# Patient Record
Sex: MALE | Race: WHITE | NOT HISPANIC OR LATINO | Employment: FULL TIME | URBAN - NONMETROPOLITAN AREA
[De-identification: names, ages, dates, MRNs, and addresses within clinical notes are randomized per-mention and may not be internally consistent; named-entity substitution may affect disease eponyms.]

---

## 2020-01-27 ENCOUNTER — OFFICE VISIT (OUTPATIENT)
Dept: MEDICAL GROUP | Facility: PHYSICIAN GROUP | Age: 40
End: 2020-01-27
Payer: COMMERCIAL

## 2020-01-27 VITALS
DIASTOLIC BLOOD PRESSURE: 80 MMHG | BODY MASS INDEX: 31.56 KG/M2 | SYSTOLIC BLOOD PRESSURE: 124 MMHG | OXYGEN SATURATION: 98 % | RESPIRATION RATE: 17 BRPM | TEMPERATURE: 97.9 F | HEART RATE: 90 BPM | WEIGHT: 233 LBS | HEIGHT: 72 IN

## 2020-01-27 DIAGNOSIS — R19.00 ABDOMINAL MASS, UNSPECIFIED ABDOMINAL LOCATION: ICD-10-CM

## 2020-01-27 DIAGNOSIS — R05.3 CHRONIC COUGH: ICD-10-CM

## 2020-01-27 DIAGNOSIS — Z00.00 ANNUAL PHYSICAL EXAM: ICD-10-CM

## 2020-01-27 DIAGNOSIS — Z23 NEED FOR VACCINATION: ICD-10-CM

## 2020-01-27 PROCEDURE — 90471 IMMUNIZATION ADMIN: CPT | Performed by: NURSE PRACTITIONER

## 2020-01-27 PROCEDURE — 90715 TDAP VACCINE 7 YRS/> IM: CPT | Performed by: NURSE PRACTITIONER

## 2020-01-27 PROCEDURE — 99204 OFFICE O/P NEW MOD 45 MIN: CPT | Mod: 25 | Performed by: NURSE PRACTITIONER

## 2020-01-27 SDOH — HEALTH STABILITY: MENTAL HEALTH: HOW OFTEN DO YOU HAVE A DRINK CONTAINING ALCOHOL?: 2-3 TIMES A WEEK

## 2020-01-27 SDOH — HEALTH STABILITY: PHYSICAL HEALTH: ON AVERAGE, HOW MANY DAYS PER WEEK DO YOU ENGAGE IN MODERATE TO STRENUOUS EXERCISE (LIKE A BRISK WALK)?: 7 DAYS

## 2020-01-27 ASSESSMENT — ENCOUNTER SYMPTOMS
VOMITING: 0
CONSTIPATION: 0
COUGH: 1
DIZZINESS: 0
FEVER: 0
ABDOMINAL PAIN: 1
HEADACHES: 0
WHEEZING: 1
PALPITATIONS: 0
SPUTUM PRODUCTION: 0
DEPRESSION: 0
HEMOPTYSIS: 0
BLOOD IN STOOL: 0
CHILLS: 0
SHORTNESS OF BREATH: 1
DIARRHEA: 0
NAUSEA: 0

## 2020-01-27 ASSESSMENT — PATIENT HEALTH QUESTIONNAIRE - PHQ9: CLINICAL INTERPRETATION OF PHQ2 SCORE: 0

## 2020-01-27 NOTE — PROGRESS NOTES
New Patient appointment    Mason Lehman is a 39 y.o. male here to establish care. His previous PCP was none. He  presents with the following concerns:    HPI:      Abdominal Pain   This is a new problem. The current episode started more than 1 year ago. The problem has been gradually worsening. The pain is located in the periumbilical region. The pain is at a severity of 3/10. The pain is mild. The abdominal pain does not radiate. Pertinent negatives include no constipation, diarrhea, dysuria, fever, frequency, headaches, nausea or vomiting. The pain is aggravated by movement. The pain is relieved by nothing. He has tried nothing for the symptoms.   Cough   This is a new problem. The current episode started more than 1 month ago. The problem has been waxing and waning. The cough is non-productive. Associated symptoms include shortness of breath and wheezing. Pertinent negatives include no chest pain, chills, fever, headaches or hemoptysis. Associated symptoms comments: Snoring and apneic episodes  . The symptoms are aggravated by lying down. Risk factors for lung disease include smoking/tobacco exposure (He also reports 2 year history of meth use ). He has tried nothing for the symptoms. His past medical history is significant for asthma.        Current medicines (including changes today)  Current Outpatient Medications   Medication Sig Dispense Refill   • albuterol (VENTOLIN OR PROVENTIL) 108 (90 BASE) MCG/ACT Aero Soln inhalation aerosol Inhale 2 Puffs by mouth every four hours as needed for Shortness of Breath. 1 Inhaler 1   • albuterol (PROVENTIL) 2.5mg/0.5ml Nebu Soln 0.5 mL by Nebulization route every four hours as needed for Shortness of Breath. 1 Bottle 0   • promethazine-codeine (PHENERGAN-CODEINE) 6.25-10 MG/5ML Syrup Take 5 mL by mouth 4 times a day as needed for Cough. 120 mL 0     No current facility-administered medications for this visit.      He  has a past medical history of Asthma and MRSA  (methicillin resistant Staphylococcus aureus).  He  has a past surgical history that includes appendectomy.  Social History     Tobacco Use   • Smoking status: Former Smoker     Packs/day: 1.00     Years: 10.00     Pack years: 10.00     Types: Cigarettes     Last attempt to quit: 2019     Years since quittin.0   • Smokeless tobacco: Never Used   Substance Use Topics   • Alcohol use: Yes     Alcohol/week: 1.8 oz     Types: 3 Cans of beer per week     Frequency: 2-3 times a week   • Drug use: Not Currently     Types: Methamphetamines     Comment: daily for 2 years; sober 8 years      Social History     Patient does not qualify to have social determinant information on file (likely too young).   Social History Narrative   • Not on file     Family History   Problem Relation Age of Onset   • Arthritis Mother    • No Known Problems Father    • No Known Problems Sister    • No Known Problems Maternal Grandmother    • No Known Problems Maternal Grandfather    • No Known Problems Daughter    • No Known Problems Daughter    • No Known Problems Daughter    • No Known Problems Son      No family status information on file.       Review of Systems   Constitutional: Negative for chills, fever and malaise/fatigue.   HENT: Negative for hearing loss.    Respiratory: Positive for cough, shortness of breath and wheezing. Negative for hemoptysis and sputum production.    Cardiovascular: Negative for chest pain and palpitations.   Gastrointestinal: Positive for abdominal pain. Negative for blood in stool, constipation, diarrhea, nausea and vomiting.   Genitourinary: Negative for dysuria and frequency.   Neurological: Negative for dizziness and headaches.   Psychiatric/Behavioral: Negative for depression.       All other systems reviewed and are negative    Depression Screening    Little interest or pleasure in doing things?  0 - not at all  Feeling down, depressed , or hopeless? 0 - not at all  Patient Health Questionnaire  Score: 0    If depressive symptoms identified deferred to follow up visit unless specifically addressed in assesment and plan.      Interpretation of PHQ-9 Total Score   Score Severity   1-4 Minimal Depression   5-9 Mild Depression   10-14 Moderate Depression   15-19 Moderately Severe Depression   20-27 Severe Depression     Objective:     /80   Pulse 90   Temp 36.6 °C (97.9 °F) (Temporal)   Resp 17   Ht 1.829 m (6')   Wt 105.7 kg (233 lb)   SpO2 98%  Body mass index is 31.6 kg/m².    Physical Exam:  Constitutional: Oriented to person, place, and time and well-developed, well-nourished, and in no distress.   HENT:   Head: Normocephalic and atraumatic.   Right Ear: Tympanic membrane and external ear normal.   Left Ear: Tympanic membrane and external ear normal.   Mouth/Throat: Oropharynx is clear and moist and mucous membranes are normal. No oropharyngeal exudate or posterior oropharyngeal erythema.   Eyes: Conjunctivae and EOM are normal. Pupils are equal, round, and reactive to light.   Neck: Normal range of motion. Neck supple. No thyromegaly present.   Cardiovascular: Normal rate, regular rhythm, normal heart sounds. Radial pulses intact. Exam reveals no friction rub. No murmur heard.  Pulmonary/Chest: Effort normal and breath sounds normal. No respiratory distress or use of accessory muscles. No wheezes, rhonchi, or rales.   Abdominal: Soft. Bowel sounds are normal. Exhibits umbilical hernia that is reducible. There is no tenderness. No hepatosplenomegaly.    Musculoskeletal: Full range of motion. No deformity or swelling of joints. DTRs intact.   Neurological: Alert and oriented to person, place, and time. Gait normal.   Skin: Skin is warm and dry. No cyanosis. No edema.  Psychiatric: Mood, memory, affect and judgment normal.     Assessment and Plan:   The following treatment plan was discussed    1. Chronic cough  Chest xray ordered. Based on hx of smoking, meth use, and environmental exposures- he  was referred to pulmonology for further evaluation. I also recommend sleep study to r/o sleep apnea.  - REFERRAL TO PULMONOLOGY  - DX-CHEST-2 VIEWS; Future    2. Abdominal mass, unspecified abdominal location  His symptoms most resemble umbilical hernia. US of abdomen ordered for further evaluation.   - US-HERNIA ABDOMEN; Future    3. Annual physical exam  Routine screening labs ordered.  - Comp Metabolic Panel; Future  - HEMOGLOBIN A1C; Future  - Lipid Profile; Future  - CBC WITH DIFFERENTIAL; Future  - TSH; Future  - FREE THYROXINE; Future    4. Need for vaccination  I have personally administered the ordered medication/vaccine.  - Tdap =>6yo IM    Records requested.    Followup: Return if symptoms worsen or fail to improve.

## 2020-02-11 ENCOUNTER — HOSPITAL ENCOUNTER (OUTPATIENT)
Dept: RADIOLOGY | Facility: MEDICAL CENTER | Age: 40
End: 2020-02-11
Attending: NURSE PRACTITIONER
Payer: COMMERCIAL

## 2020-02-11 DIAGNOSIS — R19.00 ABDOMINAL MASS, UNSPECIFIED ABDOMINAL LOCATION: ICD-10-CM

## 2020-02-11 DIAGNOSIS — R05.3 CHRONIC COUGH: ICD-10-CM

## 2020-02-11 PROCEDURE — 76705 ECHO EXAM OF ABDOMEN: CPT

## 2020-02-11 PROCEDURE — 71046 X-RAY EXAM CHEST 2 VIEWS: CPT

## 2020-05-11 ENCOUNTER — TELEMEDICINE2 (OUTPATIENT)
Dept: PULMONOLOGY | Facility: HOSPICE | Age: 40
End: 2020-05-11
Payer: COMMERCIAL

## 2020-05-11 VITALS
OXYGEN SATURATION: 95 % | HEART RATE: 68 BPM | RESPIRATION RATE: 16 BRPM | BODY MASS INDEX: 32.21 KG/M2 | DIASTOLIC BLOOD PRESSURE: 84 MMHG | HEIGHT: 70 IN | SYSTOLIC BLOOD PRESSURE: 148 MMHG | WEIGHT: 225 LBS | TEMPERATURE: 98.2 F

## 2020-05-11 DIAGNOSIS — R05.9 COUGH: ICD-10-CM

## 2020-05-11 DIAGNOSIS — J45.30 MILD PERSISTENT ASTHMA WITHOUT COMPLICATION: ICD-10-CM

## 2020-05-11 PROCEDURE — 99204 OFFICE O/P NEW MOD 45 MIN: CPT | Performed by: INTERNAL MEDICINE

## 2020-05-11 ASSESSMENT — PAIN SCALES - GENERAL: PAINLEVEL: NO PAIN

## 2020-05-11 NOTE — PROGRESS NOTES
Chief Complaint   Patient presents with   • New Patient     Cough       HPI:  The patient is a 40-year-old male who has had a cough on an almost daily basis for the last year or so.  He has a 10-pack-year smoking history.  He quit smoking in January 2019.  He said he had an upper respiratory infection in March 2019 and although he recovered he was left with a daily cough which usually occurs in the morning.  He does have a history of asthma as a child.  He does have seasonal allergies.  A prior CBC did not show any significant eosinophilia.  His chest x-ray showed some minimal atelectasis on the left.  He does have occasional wheezing.  His cough and symptoms are relieved with albuterol.  He has not been on any topical anti-inflammatory therapy.    Please note that I could not evaluate the patient in person at the site. All examination and evaluation of the patient and information gathering from the patient and/or the family were done jointly by the requesting provider, [facility] Messina at the site and me via licensed and securely encrypted tele-video communication equipment with the assistance of a trained tele-presenter at the originating site.  As such, my assessments and recommendations are limited as far as such telecommunication allows.    Consulting MD: Taz Aiken M.D.  Requesting Physician: Deann BRIAN  Patient name:      Mason Lehman        Past Medical History:   Diagnosis Date   • Asthma    • Cough    • MRSA (methicillin resistant Staphylococcus aureus)        ROS:   Constitutional: Denies fevers, chills, night sweats, fatigue or weight loss  Eyes: Denies vision loss, pain, drainage, double vision  Ears, Nose, Throat: Denies earache, tinnitus, hoarseness  Cardiovascular: Denies chest pain, tightness, palpitations  Respiratory: See HPI  Sleep: Denies, snoring, apnea  GI: Denies abdominal pain, nausea, vomiting, diarrhea  : Denies frequent urination, hematuria, painful  urination  Musculoskeletal: Denies back pain, painful joints, sore muscles  Neurological: Denies headaches, seizures  Skin: Denies rashes, color changes  Psychiatric: Denies depression or thoughts of suicide  Hematologic: Denies bleeding tendency or clotting tendency  Allergic/Immunologic: Denies rhinitis, skin sensitivity    Social History     Socioeconomic History   • Marital status:      Spouse name: Not on file   • Number of children: Not on file   • Years of education: Not on file   • Highest education level: Not on file   Occupational History   • Not on file   Social Needs   • Financial resource strain: Not on file   • Food insecurity     Worry: Not on file     Inability: Not on file   • Transportation needs     Medical: Not on file     Non-medical: Not on file   Tobacco Use   • Smoking status: Former Smoker     Packs/day: 1.00     Years: 10.00     Pack years: 10.00     Types: Cigarettes     Last attempt to quit: 2019     Years since quittin.2   • Smokeless tobacco: Never Used   Substance and Sexual Activity   • Alcohol use: Yes     Alcohol/week: 1.8 oz     Types: 3 Cans of beer per week     Frequency: 2-3 times a week   • Drug use: Not Currently     Types: Methamphetamines     Comment: daily for 2 years; sober 8 years    • Sexual activity: Yes     Partners: Female     Comment: ; 12 years    Lifestyle   • Physical activity     Days per week: 7 days     Minutes per session: Not on file   • Stress: Not on file   Relationships   • Social connections     Talks on phone: Not on file     Gets together: Not on file     Attends Jainism service: Not on file     Active member of club or organization: Not on file     Attends meetings of clubs or organizations: Not on file     Relationship status: Not on file   • Intimate partner violence     Fear of current or ex partner: Not on file     Emotionally abused: Not on file     Physically abused: Not on file     Forced sexual activity: Not on file  "  Other Topics Concern   • Not on file   Social History Narrative   • Not on file     Patient has no known allergies.  Current Outpatient Medications on File Prior to Visit   Medication Sig Dispense Refill   • albuterol (VENTOLIN OR PROVENTIL) 108 (90 BASE) MCG/ACT Aero Soln inhalation aerosol Inhale 2 Puffs by mouth every four hours as needed for Shortness of Breath. 1 Inhaler 1   • albuterol (PROVENTIL) 2.5mg/0.5ml Nebu Soln 0.5 mL by Nebulization route every four hours as needed for Shortness of Breath. 1 Bottle 0   • promethazine-codeine (PHENERGAN-CODEINE) 6.25-10 MG/5ML Syrup Take 5 mL by mouth 4 times a day as needed for Cough. 120 mL 0     No current facility-administered medications on file prior to visit.      /84   Pulse 68   Temp 36.8 °C (98.2 °F) (Temporal)   Resp 16   Ht 1.778 m (5' 10\")   Wt 102.1 kg (225 lb)   SpO2 95%   Family History   Problem Relation Age of Onset   • Arthritis Mother    • No Known Problems Father    • No Known Problems Sister    • No Known Problems Maternal Grandmother    • No Known Problems Maternal Grandfather    • No Known Problems Daughter    • No Known Problems Daughter    • No Known Problems Daughter    • No Known Problems Son        Physical Exam:    HEENT: PERRLA, EOMI, no scleral icterus, no nasal or oral lesions  Neck: No thyromegaly, no adenopathy, no bruits  Mallampatti: Grade II  Lungs: Equal breath sounds, no wheezes or crackles  Heart: Regular rate and rhythm, no gallops or murmurs  Abdomen: Soft, benign, no organomegaly  Extremities: No clubbing, cyanosis, or edema  Neurologic: Cranial nerve, motor, and sensory exam are normal    1. Cough    2. Mild persistent asthma without complication        Although he is a former smoker and has been a  in the past it appears that his symptoms are primarily cough and wheezing after an upper respiratory infection in March 2019.  He does have a history of asthma as a child.  We will start him on Breo 200 mcg 1 " puff every day and rinse thoroughly.  At this point I do not think we need to do a full allergy work-up unless the Breo is not effective.  We did discuss the issue of insurance coverage.  If his insurance does not cover Breo then we may have to switch to an alternative agent.  We did discuss the concept of bronchospasm and inflammation.  I would like to give him a 6-week trial and see if that has any efficacy.  We will see him back at that time for repeat evaluation.

## 2020-05-12 ENCOUNTER — TELEPHONE (OUTPATIENT)
Dept: PULMONOLOGY | Facility: HOSPICE | Age: 40
End: 2020-05-12

## 2020-05-12 NOTE — TELEPHONE ENCOUNTER
Patient's spouse Valery called.  [facility] in [place] says that Dr. Mitchell has to give his ok, for the inhaler he prescribed, to be sent thru insurance before they can fill it.  Please call her back at [phone]

## 2020-05-12 NOTE — TELEPHONE ENCOUNTER
MEDICATION PRIOR AUTHORIZATION NEEDED:    1. Name of Medication: Breo Ellipta 200/25 mcg    2. Requested By (Name of Pharmacy): [facility]     3. Is insurance on file current? yes    4. What is the name & phone number of the 3rd party payor? [employer] [phone]

## 2020-05-12 NOTE — TELEPHONE ENCOUNTER
After review with Shanique prior authorization was placed and approved prescription for breo sent this morning breo 200     Patient's wife was Contacted informed Valery authorization was placed and approved and new script was sent to the pharmacy. It should now be approved if any issues to contact clinic

## 2020-05-12 NOTE — TELEPHONE ENCOUNTER
DOCUMENTATION OF PRIOR AUTH STATUS    1. Medication name and dose: Breo Ellipta 200-25 mcg    2. Name and Phone # of Prescription coverage company: [email or web address] [phone]    3. Date Prior Auth was submitted: 5/12/2020    4. What information was given to obtain insurance decision: Clinical notes    5. Prior Auth letter Approved or Denied: Approved through 5/12/2023    6. Pharmacy notified: Yes    7. Patient notified: Yes

## 2022-06-17 ENCOUNTER — OFFICE VISIT (OUTPATIENT)
Dept: MEDICAL GROUP | Facility: PHYSICIAN GROUP | Age: 42
End: 2022-06-17
Payer: COMMERCIAL

## 2022-06-17 VITALS
HEIGHT: 70 IN | RESPIRATION RATE: 16 BRPM | DIASTOLIC BLOOD PRESSURE: 80 MMHG | TEMPERATURE: 97.2 F | BODY MASS INDEX: 34.59 KG/M2 | WEIGHT: 241.62 LBS | OXYGEN SATURATION: 97 % | HEART RATE: 70 BPM | SYSTOLIC BLOOD PRESSURE: 132 MMHG

## 2022-06-17 DIAGNOSIS — Z00.00 HEALTHCARE MAINTENANCE: ICD-10-CM

## 2022-06-17 DIAGNOSIS — Z11.3 SCREENING FOR STD (SEXUALLY TRANSMITTED DISEASE): ICD-10-CM

## 2022-06-17 DIAGNOSIS — J45.20 MILD INTERMITTENT ASTHMA WITHOUT COMPLICATION: ICD-10-CM

## 2022-06-17 DIAGNOSIS — Z00.00 HEALTH CARE MAINTENANCE: ICD-10-CM

## 2022-06-17 DIAGNOSIS — Z11.3 SCREENING EXAMINATION FOR STD (SEXUALLY TRANSMITTED DISEASE): ICD-10-CM

## 2022-06-17 DIAGNOSIS — M54.2 PAIN IN NECK: ICD-10-CM

## 2022-06-17 DIAGNOSIS — R06.83 SNORING: ICD-10-CM

## 2022-06-17 PROCEDURE — 99386 PREV VISIT NEW AGE 40-64: CPT | Performed by: STUDENT IN AN ORGANIZED HEALTH CARE EDUCATION/TRAINING PROGRAM

## 2022-06-17 ASSESSMENT — ENCOUNTER SYMPTOMS
ORTHOPNEA: 0
FOCAL WEAKNESS: 0
SHORTNESS OF BREATH: 1
FEVER: 0
ABDOMINAL PAIN: 0
HEADACHES: 0
WHEEZING: 1
DIZZINESS: 0
CHILLS: 0
PALPITATIONS: 0
COUGH: 0

## 2022-06-17 ASSESSMENT — PATIENT HEALTH QUESTIONNAIRE - PHQ9: CLINICAL INTERPRETATION OF PHQ2 SCORE: 0

## 2022-06-17 NOTE — ASSESSMENT & PLAN NOTE
Mild wheezing on exam today, denies any shortness of breath.  Currently using albuterol inhaler once a week.  We will continue with this regimen and add a ICS if necessary.  Follow-up 6 weeks

## 2022-06-17 NOTE — PROGRESS NOTES
Subjective:   HISTORY OF THE PRESENT ILLNESS: Patient is a 42 y.o. male here today to establish care.    Problem   Pain in Neck    Feels some swelling in his throat anterior neck. Started a few months, and occurs intermittently. radha dysphagyia. He does swallow.      Snoring    Reports he has long history of snoring, is getting so bad that he has to sleep in a different room than his wife.  He does wake up at night gasping for air and is starting to feel irritation and fullness in his neck.  Denies any significant fatigue.     Healthcare Maintenance   Asthma    Use albuterol inhaler once a week, occasionally at night when he feels short of breath.  This may be due to sleep apnea as he does report that he snores a lot.  No recent hospitalizations for his asthma.  No longer taking Breo inhaler.    Quit smoking about 7 years ago on average about 1 pack a day for 10 years.        Current Outpatient Medications Ordered in Epic   Medication Sig Dispense Refill   • albuterol (VENTOLIN OR PROVENTIL) 108 (90 BASE) MCG/ACT Aero Soln inhalation aerosol Inhale 2 Puffs by mouth every four hours as needed for Shortness of Breath. 1 Inhaler 1   • albuterol (PROVENTIL) 2.5mg/0.5ml Nebu Soln 0.5 mL by Nebulization route every four hours as needed for Shortness of Breath. 1 Bottle 0     No current Epic-ordered facility-administered medications on file.       Past Medical History:   Diagnosis Date   • Asthma      Past Surgical History:   Procedure Laterality Date   • APPENDECTOMY       Social History     Tobacco Use   • Smoking status: Former Smoker     Packs/day: 1.00     Years: 10.00     Pack years: 10.00     Types: Cigarettes     Quit date: 1/27/2019     Years since quitting: 3.3   • Smokeless tobacco: Never Used   Vaping Use   • Vaping Use: Never used   Substance Use Topics   • Alcohol use: Yes     Alcohol/week: 1.8 oz     Types: 3 Cans of beer per week   • Drug use: Not Currently     Types: Methamphetamines     Comment: daily  "for 2 years; sober 10 years      Family History   Problem Relation Age of Onset   • Arthritis Mother    • No Known Problems Father    • No Known Problems Sister    • No Known Problems Maternal Grandmother    • No Known Problems Maternal Grandfather    • No Known Problems Daughter    • No Known Problems Daughter    • No Known Problems Daughter    • No Known Problems Son      Current Outpatient Medications   Medication Sig Dispense Refill   • albuterol (VENTOLIN OR PROVENTIL) 108 (90 BASE) MCG/ACT Aero Soln inhalation aerosol Inhale 2 Puffs by mouth every four hours as needed for Shortness of Breath. 1 Inhaler 1   • albuterol (PROVENTIL) 2.5mg/0.5ml Nebu Soln 0.5 mL by Nebulization route every four hours as needed for Shortness of Breath. 1 Bottle 0     No current facility-administered medications for this visit.       Health Maintenance: Completed    Review of Systems   Constitutional: Negative for chills and fever.   Respiratory: Positive for shortness of breath and wheezing. Negative for cough.    Cardiovascular: Negative for chest pain, palpitations, orthopnea and leg swelling.   Gastrointestinal: Negative for abdominal pain.   Musculoskeletal: Negative for joint pain.   Neurological: Negative for dizziness, focal weakness and headaches.          Objective:     Exam: /80   Pulse 70   Temp 36.2 °C (97.2 °F) (Temporal)   Resp 16   Ht 1.778 m (5' 10\")   Wt 110 kg (241 lb 10 oz)   SpO2 97%  Body mass index is 34.67 kg/m².    Physical Exam  Vitals reviewed.   Constitutional:       General: He is not in acute distress.     Appearance: Normal appearance. He is not ill-appearing or toxic-appearing.   HENT:      Head: Normocephalic and atraumatic.      Right Ear: Tympanic membrane, ear canal and external ear normal. There is no impacted cerumen.      Left Ear: Tympanic membrane, ear canal and external ear normal. There is no impacted cerumen.      Mouth/Throat:      Pharynx: Oropharynx is clear. No " oropharyngeal exudate or posterior oropharyngeal erythema.   Eyes:      General: No scleral icterus.        Right eye: No discharge.         Left eye: No discharge.      Conjunctiva/sclera: Conjunctivae normal.   Neck:      Vascular: No carotid bruit.   Cardiovascular:      Rate and Rhythm: Normal rate and regular rhythm.      Pulses: Normal pulses.      Heart sounds: Normal heart sounds. No murmur heard.  Pulmonary:      Effort: Pulmonary effort is normal. No respiratory distress.      Breath sounds: Normal breath sounds. No wheezing or rales.   Abdominal:      General: Abdomen is flat. Bowel sounds are normal. There is no distension.      Palpations: Abdomen is soft. There is no mass.      Tenderness: There is no abdominal tenderness. There is no guarding or rebound.   Musculoskeletal:      Cervical back: Neck supple.      Right lower leg: No edema.      Left lower leg: No edema.   Skin:     General: Skin is warm and dry.   Neurological:      General: No focal deficit present.      Mental Status: He is alert.   Psychiatric:         Mood and Affect: Mood normal.         Thought Content: Thought content normal.          Assessment & Plan:   42 y.o. male with the following -    Problem List Items Addressed This Visit     Pain in neck     No nodules, fullness, goiter on exam.  Oropharynx within normal limits please see assessment for snoring and work-up for sleep apnea.           Asthma     Mild wheezing on exam today, denies any shortness of breath.  Currently using albuterol inhaler once a week.  We will continue with this regimen and add a ICS if necessary.  Follow-up 6 weeks           Snoring     Sleep study ordered, advised that losing weight would help a lot towards his symptoms.  Stressed the importance of this test for diagnosing sleep apnea early as to avoid heart complications later in life.           Relevant Orders    Referral to Pulmonary and Sleep Medicine    Healthcare maintenance     Patient here for a  preventive medicine visit today and to establish care.  -Reviewed all past medical history, family history, social history    -Diet and exercise appropriate counseling given  -Social determinants of health reviewed  -Tobacco, alcohol, recreational drug use: Reviewed no concerns  -Occupation: Construction  -Cholesterol screening: Ordered  -Diabetes screening: Ordered    Immunizations/Infectious disease:  STI screening: Ordered  Safe sex practices discusssed  HIV screening: Ordered  Immunizations: Discussed COVID vaccination at next visit    Cancer screenings:  Colorectal cancer screening: Denies family history of colon cancer.               Other Visit Diagnoses     Health care maintenance        Relevant Orders    Lipid Profile    HEMOGLOBIN A1C    CBC WITHOUT DIFFERENTIAL    TSH WITH REFLEX TO FT4    VITAMIN D,25 HYDROXY    Screening examination for STD (sexually transmitted disease)        Screening for STD (sexually transmitted disease)        Relevant Orders    Chlamydia/GC, PCR (Urine)    T.PALLIDUM AB EIA    HIV AG/AB COMBO ASSAY SCREENING           Return in about 6 weeks (around 7/29/2022).    Please note that this dictation was created using voice recognition software. I have made every reasonable attempt to correct obvious errors, but I expect that there are errors of grammar and possibly content that I did not discover before finalizing the note.

## 2022-06-17 NOTE — ASSESSMENT & PLAN NOTE
Patient here for a preventive medicine visit today and to establish care.  -Reviewed all past medical history, family history, social history    -Diet and exercise appropriate counseling given  -Social determinants of health reviewed  -Tobacco, alcohol, recreational drug use: Reviewed no concerns  -Occupation: Construction  -Cholesterol screening: Ordered  -Diabetes screening: Ordered    Immunizations/Infectious disease:  STI screening: Ordered  Safe sex practices discusssed  HIV screening: Ordered  Immunizations: Discussed COVID vaccination at next visit    Cancer screenings:  Colorectal cancer screening: Denies family history of colon cancer.

## 2022-06-17 NOTE — ASSESSMENT & PLAN NOTE
No nodules, fullness, goiter on exam.  Oropharynx within normal limits please see assessment for snoring and work-up for sleep apnea.

## 2022-06-17 NOTE — ASSESSMENT & PLAN NOTE
Sleep study ordered, advised that losing weight would help a lot towards his symptoms.  Stressed the importance of this test for diagnosing sleep apnea early as to avoid heart complications later in life.

## 2024-03-21 ENCOUNTER — OFFICE VISIT (OUTPATIENT)
Dept: MEDICAL GROUP | Facility: PHYSICIAN GROUP | Age: 44
End: 2024-03-21
Payer: COMMERCIAL

## 2024-03-21 VITALS
WEIGHT: 236 LBS | HEIGHT: 70 IN | HEART RATE: 105 BPM | TEMPERATURE: 97.1 F | RESPIRATION RATE: 14 BRPM | BODY MASS INDEX: 33.79 KG/M2 | SYSTOLIC BLOOD PRESSURE: 126 MMHG | DIASTOLIC BLOOD PRESSURE: 98 MMHG | OXYGEN SATURATION: 98 %

## 2024-03-21 DIAGNOSIS — R05.1 ACUTE COUGH: ICD-10-CM

## 2024-03-21 DIAGNOSIS — J06.9 UPPER RESPIRATORY TRACT INFECTION, UNSPECIFIED TYPE: ICD-10-CM

## 2024-03-21 LAB
FLUAV RNA SPEC QL NAA+PROBE: NEGATIVE
FLUBV RNA SPEC QL NAA+PROBE: NEGATIVE
RSV RNA SPEC QL NAA+PROBE: NEGATIVE
SARS-COV-2 RNA RESP QL NAA+PROBE: NEGATIVE

## 2024-03-21 PROCEDURE — 3074F SYST BP LT 130 MM HG: CPT | Performed by: PHYSICIAN ASSISTANT

## 2024-03-21 PROCEDURE — 99213 OFFICE O/P EST LOW 20 MIN: CPT | Performed by: PHYSICIAN ASSISTANT

## 2024-03-21 PROCEDURE — 0241U POCT CEPHEID COV-2, FLU A/B, RSV - PCR: CPT | Performed by: PHYSICIAN ASSISTANT

## 2024-03-21 PROCEDURE — 3080F DIAST BP >= 90 MM HG: CPT | Performed by: PHYSICIAN ASSISTANT

## 2024-03-21 RX ORDER — ALBUTEROL SULFATE 90 UG/1
2 AEROSOL, METERED RESPIRATORY (INHALATION) EVERY 4 HOURS PRN
Qty: 1 EACH | Refills: 1 | Status: SHIPPED | OUTPATIENT
Start: 2024-03-21

## 2024-03-21 ASSESSMENT — ENCOUNTER SYMPTOMS
COUGH: 1
SORE THROAT: 1
SPUTUM PRODUCTION: 0
WHEEZING: 1
FEVER: 1
CHILLS: 1
SHORTNESS OF BREATH: 1

## 2024-03-21 NOTE — PROGRESS NOTES
"CC:  Chief Complaint   Patient presents with    Cough     Sore throat, fever, chills  x5days        HISTORY OF PRESENT ILLNESS: Patient is a 44 y.o. male established patient presenting with issues below  Pt having dry cough since 4 days. Subjective fever last night. Throat is sore from coughing. Taking mucinex and nyquil. Has mild SOB. No CP.     Current Outpatient Medications   Medication Sig Dispense Refill    albuterol (VENTOLIN OR PROVENTIL) 108 (90 BASE) MCG/ACT Aero Soln inhalation aerosol Inhale 2 Puffs by mouth every four hours as needed for Shortness of Breath. 1 Inhaler 1    albuterol (PROVENTIL) 2.5mg/0.5ml Nebu Soln 0.5 mL by Nebulization route every four hours as needed for Shortness of Breath. 1 Bottle 0     No current facility-administered medications for this visit.        ROS:     Review of Systems   Constitutional:  Positive for chills and fever.   HENT:  Positive for congestion and sore throat. Negative for ear pain.    Respiratory:  Positive for cough, shortness of breath and wheezing. Negative for sputum production.    Cardiovascular:  Negative for chest pain.       Exam:    BP (!) 126/98   Pulse (!) 105   Temp 36.2 °C (97.1 °F) (Temporal)   Resp 14   Ht 1.778 m (5' 10\")   Wt 107 kg (236 lb)   SpO2 98%  Body mass index is 33.86 kg/m².    General:  Well nourished, well developed male in NAD  Head is grossly normal.  Neck: Supple.   Pulmonary: Clear to auscultation. Positive for faint wheeze in R lower lobe  Cardiac: Regular rate and rhythm. No murmurs.  Skin: Warm and dry. No obvious lesions  Neuro: Normal muscle tone. Gait normal. Alert and oriented.  Psych: Normal mood and affect      Please note that this dictation was created using voice recognition software. I have made every reasonable attempt to correct obvious errors, but I expect that there are errors of grammar and possibly content that I did not discover before finalizing the note.        Assessment/Plan:  1. Acute " cough  Negative for POCT tests. Take albuterol PRN  - POCT Cepheid CoV-2, Flu A/B, RSV - PCR  - albuterol 108 (90 Base) MCG/ACT Aero Soln inhalation aerosol; Inhale 2 Puffs every four hours as needed for Shortness of Breath.  Dispense: 1 Each; Refill: 1    2. Upper respiratory tract infection, unspecified type  Continue OTC treatment